# Patient Record
Sex: FEMALE | Race: ASIAN | NOT HISPANIC OR LATINO
[De-identification: names, ages, dates, MRNs, and addresses within clinical notes are randomized per-mention and may not be internally consistent; named-entity substitution may affect disease eponyms.]

---

## 2022-10-13 ENCOUNTER — APPOINTMENT (OUTPATIENT)
Dept: ORTHOPEDIC SURGERY | Facility: CLINIC | Age: 57
End: 2022-10-13

## 2022-10-13 PROBLEM — Z00.00 ENCOUNTER FOR PREVENTIVE HEALTH EXAMINATION: Status: ACTIVE | Noted: 2022-10-13

## 2022-10-14 ENCOUNTER — APPOINTMENT (OUTPATIENT)
Dept: ORTHOPEDIC SURGERY | Facility: CLINIC | Age: 57
End: 2022-10-14

## 2022-10-14 VITALS — HEIGHT: 62 IN | WEIGHT: 162 LBS | BODY MASS INDEX: 29.81 KG/M2

## 2022-10-14 DIAGNOSIS — Z87.19 PERSONAL HISTORY OF OTHER DISEASES OF THE DIGESTIVE SYSTEM: ICD-10-CM

## 2022-10-14 DIAGNOSIS — E11.9 TYPE 2 DIABETES MELLITUS W/OUT COMPLICATIONS: ICD-10-CM

## 2022-10-14 PROCEDURE — 99203 OFFICE O/P NEW LOW 30 MIN: CPT | Mod: 25

## 2022-10-14 PROCEDURE — 20610 DRAIN/INJ JOINT/BURSA W/O US: CPT | Mod: 50

## 2022-10-14 RX ORDER — ESOMEPRAZOLE MAGNESIUM 20 MG/1
TABLET ORAL
Refills: 0 | Status: ACTIVE | COMMUNITY

## 2022-10-14 RX ORDER — METFORMIN HYDROCHLORIDE 500 MG/1
500 TABLET, COATED ORAL
Refills: 0 | Status: ACTIVE | COMMUNITY

## 2022-10-14 NOTE — HISTORY OF PRESENT ILLNESS
[Gradual] : gradual [9] : 9 [7] : 7 [Localized] : localized [Sharp] : sharp [Tightness] : tightness [Constant] : constant [Nothing helps with pain getting better] : Nothing helps with pain getting better [Walking] : walking [Stairs] : stairs [Part time] : Work status: part time [de-identified] : Patient presents today with bilateral knee pain for years with NKI. Previously went to [facility] and had both CSI and gel injections with some relief, last ones were ~4 years ago. Experiencing localized pain. Taking Tylenol PRN. Had bilateral knee xrays at . Wearing compression sleeves. [] : no [FreeTextEntry1] : bilateral knees [de-identified] : rising from sitting to standing, weather [de-identified] : bilateral knees xrays at [facility] [de-identified] :

## 2022-10-14 NOTE — PROCEDURE
[Bilateral] : bilaterally of the [Knee] : knee [Pain] : pain [Inflammation] : inflammation [Betadine] : betadine [Sterile technique used] : sterile technique used [___ cc    6mg] :  Betamethasone ([facility]elestone) ~Vcc of 6mg [___ cc    1%] : Lidocaine ~Vcc of 1%  [] : Patient tolerated procedure well [Call if redness, pain or fever occur] : call if redness, pain or fever occur [Apply ice for 15min out of every hour for the next 12-24 hours as tolerated] : apply ice for 15 minutes out of every hour for the next 12-24 hours as tolerated [Previous OTC use and PT nontherapeutic] : patient has tried OTC's including aspirin, Ibuprofen, Aleve, etc or prescription NSAIDS, and/or exercises at home and/or physical therapy without satisfactory response [Patient had decreased mobility in the joint] : patient had decreased mobility in the joint [Risks, benefits, alternatives discussed / Verbal consent obtained] : the risks benefits, and alternatives have been discussed, and verbal consent was obtained

## 2022-10-14 NOTE — PHYSICAL EXAM
[] : ROM is limited secondary to pain [AP] : anteroposterior [Lateral] : lateral [AP Standing] : anteroposterior standing [Outside films reviewed] : Outside films reviewed [Mild tricompartmental OA medial narrowing] : Mild tricompartmental OA medial narrowing [Mild tricompartmental OA lateral narrowing] : Mild tricompartmental OA lateral narrowing [FreeTextEntry9] : RIght knee extension 10 degrees, flexion 90  degrees\par Left knee extension 0 degrees, flexion 90 degrees. [de-identified] : extension 10 degrees

## 2022-10-14 NOTE — DISCUSSION/SUMMARY
[de-identified] : Options were discussed in length including NSAIDS, anti-inflammatories, oral steroids, physical therapy, or MRI. She states she does not recall whether the CSI provided more relief than the gel. She has weddings coming up and does not want surgery. Would recommend CSI or PT at this time. [facility] Plan is for CSI for bilateral knees. \par \par Entered by Bessie Mitchell acting as scribe.\par Dr. Umanzor Attestation\par The documentation recorded by the scribe, in my presence, accurately reflects the service I, Dr. Umanzor, personally performed, and the decisions made by me with my edits as appropriate.\par \par

## 2023-03-06 ENCOUNTER — APPOINTMENT (OUTPATIENT)
Dept: ORTHOPEDIC SURGERY | Facility: CLINIC | Age: 58
End: 2023-03-06

## 2023-08-22 ENCOUNTER — APPOINTMENT (OUTPATIENT)
Dept: ORTHOPEDIC SURGERY | Facility: CLINIC | Age: 58
End: 2023-08-22

## 2024-01-29 ENCOUNTER — APPOINTMENT (OUTPATIENT)
Dept: ORTHOPEDIC SURGERY | Facility: CLINIC | Age: 59
End: 2024-01-29
Payer: COMMERCIAL

## 2024-01-29 PROCEDURE — 72040 X-RAY EXAM NECK SPINE 2-3 VW: CPT

## 2024-01-29 PROCEDURE — 99214 OFFICE O/P EST MOD 30 MIN: CPT | Mod: 25

## 2024-01-29 PROCEDURE — 73030 X-RAY EXAM OF SHOULDER: CPT | Mod: RT

## 2024-01-29 PROCEDURE — 20610 DRAIN/INJ JOINT/BURSA W/O US: CPT | Mod: RT

## 2024-01-29 RX ORDER — DICLOFENAC SODIUM 1% 10 MG/G
1 GEL TOPICAL
Qty: 1 | Refills: 2 | Status: ACTIVE | COMMUNITY
Start: 2024-01-29 | End: 1900-01-01

## 2024-01-29 NOTE — HISTORY OF PRESENT ILLNESS
[de-identified] : Date of evaluation 01/29/2024 Patient age in years is 58 Occupation is  Body part causing symptoms is the right shoulder Symptoms began 3 weeks ago, was moving sofa and gradually felt worsening pain in the shoulder Location of pain is lateral Quality of pain is sharp Pain score at rest is 5/10 Pain score during activity is 9/10 Radicular symptoms are present since yesterday Prior treatments include Tylenol  Patient's condition is not associated with workers compensation, no-fault or interscholastic athletics Patient has well controlled DM

## 2024-01-29 NOTE — IMAGING
[Right] : right shoulder [de-identified] : (Exam: Shoulder)   Laterality is right    Patient is in no acute distress, alert and oriented Sensation is grossly intact to light touch in the hand Motor function is 5/5 in the hand Capillary refill is less than 2 seconds in the fingers Lymphadenopathy is not present Peripheral edema is not present   Skin is intact Swelling is not present Atrophy is not present Scapular winging is not present Deformity of the AC joint is not present Deformity of the biceps is not present   Bicipital groove tenderness is present AC joint tenderness is not present Scapulothoracic tenderness is not present   Active forward elevation is 150 Passive forward elevation is 165 External rotation at the side is 50 Internal rotation behind the back is to the level of sacrum   Forward elevation strength is 5-/5 External rotation strength at the side is 5/5 Internal rotation strength at the side is 5/5 Deltoid strength of anterior, posterior and lateral heads is 5/5   Reed test is abnormal OBriens test is abnormal Empty can test is abnormal Speeds test is abnormal Cross body adduction test is normal Belly press test is normal Apprehension and relocation is normal Sulcus sign is normal    [Straightening consistent with spasm] : Straightening consistent with spasm [Disc space narrowing] : Disc space narrowing [No spinal deformity, fracture, lytic lesion, or marked single level collapse] : No spinal deformity, fracture, lytic lesion, or marked single level collapse [FreeTextEntry1] : tiny calcific tendonitis, no glenohumeral osteoarthritis

## 2024-01-29 NOTE — DISCUSSION/SUMMARY
[de-identified] : History, clinical examination and imaging were most consistent with: -right shoulder rotator cuff partial tear, possible calcific tendonitis versus early adhesive capsulitis -cervical radiculopathy and degenerative disk disease  The diagnosis was explained in detail. The potential non-surgical and surgical treatments were reviewed. The relative risks and benefits of each option were considered relative to the patients age, activity level, medical history, symptom severity and previously attempted treatments.   The patient was advised to consult with their primary medical provider prior to initiation of any new medications to reduce the risk of adverse effects specific to their long-term home medications and medical history. The risk of gastrointestinal irritation and kidney injury specific to long-term NSAID use was discussed.   -Patient will proceed with formal PT. -Cortisone injection performed today for symptom relief. -Over the counter NSAID as needed for pain. -The added clinical utility of an MRI was discussed. The patient deferred further diagnostic testing at this time. -Discussed that the cervical symptoms are likely compensatory, consider cervical referral if symptoms persist. -Follow up in 6 weeks. If symptoms persist consider MRI.    ([facility])   Problem Complexity -Moderate: chronic illness with exacerbation   Risk -Moderate: injection   (Procedure: Corticosteroid Injection)   Injection location was the: -right subacromial bursa   Injection contents were: 40 mg of kenalog and 5 mL of 1% lidocaine   Procedure Details: -Informed consent was obtained. Discussed possible risks of corticosteroid injection including hyperglycemia, infection and skin discoloration. -Discussed that due to infection risk, an interval between injection and any future surgery is 6 weeks for arthroscopy and 3 months for arthroplasty. -Injection performed using aseptic technique. Hemostasis was confirmed. -Procedure was tolerated well with no complications.

## 2024-02-12 ENCOUNTER — APPOINTMENT (OUTPATIENT)
Dept: ORTHOPEDIC SURGERY | Facility: CLINIC | Age: 59
End: 2024-02-12
Payer: COMMERCIAL

## 2024-02-12 PROCEDURE — 99214 OFFICE O/P EST MOD 30 MIN: CPT

## 2024-02-12 RX ORDER — MELOXICAM 15 MG/1
15 TABLET ORAL DAILY
Qty: 30 | Refills: 2 | Status: ACTIVE | COMMUNITY
Start: 2024-02-12 | End: 1900-01-01

## 2024-02-12 RX ORDER — CYCLOBENZAPRINE HYDROCHLORIDE 5 MG/1
5 TABLET, FILM COATED ORAL
Qty: 30 | Refills: 2 | Status: ACTIVE | COMMUNITY
Start: 2024-02-12 | End: 1900-01-01

## 2024-02-12 NOTE — IMAGING
[Straightening consistent with spasm] : Straightening consistent with spasm [Disc space narrowing] : Disc space narrowing [No spinal deformity, fracture, lytic lesion, or marked single level collapse] : No spinal deformity, fracture, lytic lesion, or marked single level collapse [Right] : right shoulder [de-identified] : (Exam: Shoulder)   Laterality is right    Patient is in no acute distress, alert and oriented Sensation is grossly intact to light touch in the hand Motor function is 5/5 in the hand Capillary refill is less than 2 seconds in the fingers Lymphadenopathy is not present Peripheral edema is not present   Skin is intact Swelling is not present Atrophy is not present Scapular winging is not present Deformity of the AC joint is not present Deformity of the biceps is not present   Bicipital groove tenderness is present AC joint tenderness is not present Scapulothoracic tenderness is not present   Active forward elevation is 90 Passive forward elevation is 120 limited by pain External rotation at the side is 50 Internal rotation behind the back is to the level of sacrum   Forward elevation strength is 5-/5 External rotation strength at the side is 5/5 Internal rotation strength at the side is 5/5 Deltoid strength of anterior, posterior and lateral heads is 5/5   Reed test is abnormal OBriens test is abnormal Empty can test is abnormal Speeds test is abnormal Cross body adduction test is normal Belly press test is normal Apprehension and relocation is normal Sulcus sign is normal    [FreeTextEntry1] : tiny calcific tendonitis, no glenohumeral osteoarthritis

## 2024-02-12 NOTE — HISTORY OF PRESENT ILLNESS
[de-identified] : 2/12/2024 f/u RT shoulder. Patient had CSI injection last visit, 1/29/2024 and reports no relief from the shot. Patient states the pain is getting worse. shooting pain from the shoulder to the hand. massage therapy helped.   Date of evaluation 01/29/2024 Patient age in years is 58 Occupation is  Body part causing symptoms is the right shoulder Symptoms began 3 weeks ago, was moving sofa and gradually felt worsening pain in the shoulder Location of pain is lateral Quality of pain is sharp Pain score at rest is 5/10 Pain score during activity is 9/10 Radicular symptoms are present since yesterday Prior treatments include Tylenol  Patient's condition is not associated with workers compensation, no-fault or interscholastic athletics Patient has well controlled DM

## 2024-02-12 NOTE — DISCUSSION/SUMMARY
[de-identified] : History, clinical examination and imaging were most consistent with: -right shoulder rotator cuff partial tear, possible calcific tendonitis versus early adhesive capsulitis -cervical radiculopathy and degenerative disk disease  The diagnosis was explained in detail. The potential non-surgical and surgical treatments were reviewed. The relative risks and benefits of each option were considered relative to the patients age, activity level, medical history, symptom severity and previously attempted treatments.   The patient was advised to consult with their primary medical provider prior to initiation of any new medications to reduce the risk of adverse effects specific to their long-term home medications and medical history. The risk of gastrointestinal irritation and kidney injury specific to long-term NSAID use was discussed.   -MRI ordered to evaluate for rotator cuff tear. -Meloxicam and flexeril prescribed for symptom relief. -Further cervical evaluation is deferred pending further testing on the right shoulder. -Follow up when imaging completed, in the absence of full thickness rotator cuff tear would proceed with PT and possible cervical referral.  ([facility])   Problem Complexity -Moderate: chronic illness with exacerbation   Risk -Moderate: prescription medication

## 2024-02-20 ENCOUNTER — RESULT REVIEW (OUTPATIENT)
Age: 59
End: 2024-02-20

## 2024-03-01 ENCOUNTER — APPOINTMENT (OUTPATIENT)
Dept: ORTHOPEDIC SURGERY | Facility: CLINIC | Age: 59
End: 2024-03-01
Payer: MEDICAID

## 2024-03-01 DIAGNOSIS — M75.31 CALCIFIC TENDINITIS OF RIGHT SHOULDER: ICD-10-CM

## 2024-03-01 DIAGNOSIS — M75.121 COMPLETE ROTATOR CUFF TEAR OR RUPTURE OF RIGHT SHOULDER, NOT SPECIFIED AS TRAUMATIC: ICD-10-CM

## 2024-03-01 PROCEDURE — 99214 OFFICE O/P EST MOD 30 MIN: CPT

## 2024-03-01 NOTE — DISCUSSION/SUMMARY
[de-identified] : History, clinical examination and imaging were most consistent with: -right shoulder full thickness leading edge supraspinatus and upper subscapularis tear, proximal biceps tendonitis, SLAP tear -cervical radiculopathy and degenerative disk disease  The diagnosis was explained in detail. The potential non-surgical and surgical treatments were reviewed. The relative risks and benefits of each option were considered relative to the patients age, activity level, medical history, symptom severity and previously attempted treatments.   The patient was advised to consult with their primary medical provider prior to initiation of any new medications to reduce the risk of adverse effects specific to their long-term home medications and medical history. The risk of gastrointestinal irritation and kidney injury specific to long-term NSAID use was discussed.   -Discussed surgical intervention versus continued non-surgical treatment. Patient wishes to continue non-surgical treatment at this time. -Meloxicam and flexeril for symptom relief. -Repeat injection is deferred at this time. -Patient to begin formal PT.  -Discussed risk of tear progression and atrophy over time.  -Follow up in 2 months, consider repeat CSI versus surgical options if symptoms persist.  ([facility])   Problem Complexity -Moderate: chronic illness with exacerbation   Risk -Moderate: prescription medication

## 2024-03-01 NOTE — IMAGING
[Disc space narrowing] : Disc space narrowing [Straightening consistent with spasm] : Straightening consistent with spasm [No spinal deformity, fracture, lytic lesion, or marked single level collapse] : No spinal deformity, fracture, lytic lesion, or marked single level collapse [Right] : right shoulder [de-identified] : (Exam: Shoulder)   Laterality is right    Patient is in no acute distress, alert and oriented Sensation is grossly intact to light touch in the hand Motor function is 5/5 in the hand Capillary refill is less than 2 seconds in the fingers Lymphadenopathy is not present Peripheral edema is not present   Skin is intact Swelling is not present Atrophy is not present Scapular winging is not present Deformity of the AC joint is not present Deformity of the biceps is not present   Bicipital groove tenderness is present AC joint tenderness is not present Scapulothoracic tenderness is not present   Active forward elevation is 120 Passive forward elevation is 150 External rotation at the side is 50 Internal rotation behind the back is to the level of sacrum   Forward elevation strength is 5-/5 External rotation strength at the side is 5/5 Internal rotation strength at the side is 5/5 Deltoid strength of anterior, posterior and lateral heads is 5/5   Reed test is abnormal OBriens test is abnormal Empty can test is abnormal Speeds test is abnormal Cross body adduction test is normal Belly press test is normal Apprehension and relocation is normal Sulcus sign is normal    [FreeTextEntry1] : tiny calcific tendonitis, no glenohumeral osteoarthritis

## 2024-03-01 NOTE — HISTORY OF PRESENT ILLNESS
[de-identified] : 3/1/2024: f/u RT shoulder. MRI done at . Reports feeling slow improvement. taking Meloxicam and Flexeril with some relief.  2/12/2024 f/u RT shoulder. Patient had CSI injection last visit, 1/29/2024 and reports no relief from the shot. Patient states the pain is getting worse. shooting pain from the shoulder to the hand. massage therapy helped.   Date of evaluation 01/29/2024 Patient age in years is 58 Occupation is  Body part causing symptoms is the right shoulder Symptoms began 3 weeks ago, was moving sofa and gradually felt worsening pain in the shoulder Location of pain is lateral Quality of pain is sharp Pain score at rest is 5/10 Pain score during activity is 9/10 Radicular symptoms are present since yesterday Prior treatments include Tylenol  Patient's condition is not associated with workers compensation, no-fault or interscholastic athletics Patient has well controlled DM

## 2024-03-01 NOTE — DATA REVIEWED
[MRI] : MRI [Right] : of the right [Shoulder] : shoulder [FreeTextEntry1] : full thickness leading edge supra and upper third subscap tear, proximal biceps tendonitis with subluxation, SLAP tear [I independently reviewed and interpreted images and report] : I independently reviewed and interpreted images and report

## 2024-05-06 ENCOUNTER — APPOINTMENT (OUTPATIENT)
Dept: ORTHOPEDIC SURGERY | Facility: CLINIC | Age: 59
End: 2024-05-06